# Patient Record
Sex: FEMALE
[De-identification: names, ages, dates, MRNs, and addresses within clinical notes are randomized per-mention and may not be internally consistent; named-entity substitution may affect disease eponyms.]

---

## 2019-01-07 ENCOUNTER — HOSPITAL ENCOUNTER (EMERGENCY)
Dept: HOSPITAL 92 - ERS | Age: 39
LOS: 1 days | Discharge: HOME | End: 2019-01-08
Payer: SELF-PAY

## 2019-01-07 DIAGNOSIS — M79.661: ICD-10-CM

## 2019-01-07 DIAGNOSIS — M25.561: Primary | ICD-10-CM

## 2019-01-07 DIAGNOSIS — M79.651: ICD-10-CM

## 2019-01-07 PROCEDURE — 36415 COLL VENOUS BLD VENIPUNCTURE: CPT

## 2019-01-07 PROCEDURE — 84443 ASSAY THYROID STIM HORMONE: CPT

## 2019-01-07 PROCEDURE — 80053 COMPREHEN METABOLIC PANEL: CPT

## 2019-01-07 PROCEDURE — 85025 COMPLETE CBC W/AUTO DIFF WBC: CPT

## 2019-01-08 LAB
ALBUMIN SERPL BCG-MCNC: 4 G/DL (ref 3.5–5)
ALP SERPL-CCNC: 54 U/L (ref 40–150)
ALT SERPL W P-5'-P-CCNC: 11 U/L (ref 8–55)
ANION GAP SERPL CALC-SCNC: 10 MMOL/L (ref 10–20)
AST SERPL-CCNC: 12 U/L (ref 5–34)
BASOPHILS # BLD AUTO: 0.1 THOU/UL (ref 0–0.2)
BASOPHILS NFR BLD AUTO: 1.1 % (ref 0–1)
BILIRUB SERPL-MCNC: 0.3 MG/DL (ref 0.2–1.2)
BUN SERPL-MCNC: 10 MG/DL (ref 7–18.7)
CALCIUM SERPL-MCNC: 9.4 MG/DL (ref 7.8–10.44)
CHLORIDE SERPL-SCNC: 104 MMOL/L (ref 98–107)
CO2 SERPL-SCNC: 27 MMOL/L (ref 22–29)
CREAT CL PREDICTED SERPL C-G-VRATE: 0 ML/MIN (ref 70–130)
EOSINOPHIL # BLD AUTO: 0.4 THOU/UL (ref 0–0.7)
EOSINOPHIL NFR BLD AUTO: 4.1 % (ref 0–10)
GLOBULIN SER CALC-MCNC: 3.7 G/DL (ref 2.4–3.5)
GLUCOSE SERPL-MCNC: 96 MG/DL (ref 70–105)
HGB BLD-MCNC: 11.8 G/DL (ref 12–16)
LYMPHOCYTES # BLD: 3.8 THOU/UL (ref 1.2–3.4)
LYMPHOCYTES NFR BLD AUTO: 36.4 % (ref 21–51)
MCH RBC QN AUTO: 27.9 PG (ref 27–31)
MCV RBC AUTO: 85.8 FL (ref 78–98)
MONOCYTES # BLD AUTO: 0.7 THOU/UL (ref 0.11–0.59)
MONOCYTES NFR BLD AUTO: 6.6 % (ref 0–10)
NEUTROPHILS # BLD AUTO: 5.4 THOU/UL (ref 1.4–6.5)
NEUTROPHILS NFR BLD AUTO: 51.8 % (ref 42–75)
PLATELET # BLD AUTO: 338 THOU/UL (ref 130–400)
POTASSIUM SERPL-SCNC: 4.1 MMOL/L (ref 3.5–5.1)
RBC # BLD AUTO: 4.22 MILL/UL (ref 4.2–5.4)
SODIUM SERPL-SCNC: 137 MMOL/L (ref 136–145)
WBC # BLD AUTO: 10.5 THOU/UL (ref 4.8–10.8)

## 2019-01-08 NOTE — ULT
PRELIMINARY REPORT/[facility]/EMERGENTY AFTER-HOURS PROCEDURE 

 

US Right Duplex Lower Extremity Veins, Limited

 

EXAM DATE/TIME:

1/7/2019 11:47 PM

 

CLINICAL HISTORY:

38 years old, female; Pain; Leg, lower; Right

 

TECHNIQUE:

Real-time Duplex ultrasound of the Right Lower Extremity with 2-D gray scale, color Doppler flow and 
spectral waveform analysis. Limited exam was focused on the right lower extremity veins.

 

COMPARISON:

No relevant prior studies available.

 

FINDINGS:

Right deep veins: Unremarkable. The common femoral, femoral, popliteal and visualized calf veins are 
patent without thrombus. Normal compressibility, augmentation response and Doppler waveforms.

Right superficial veins: Unremarkable. Saphenofemoral junction is patent without thrombus.

Soft tissues: No popliteal cyst.

 

IMPRESSION:

No evidence of deep vein thrombosis.

 

Thank you for allowing us to participate in the care of your patient.

Dictated and Authenticated by: Ranjit Eid MD

01/08/2019 1:13 AM Central Time ([place] & [place])

 

FINAL REPORT 

 

LEFT LOWER EXTREMITY VENOUS DOPPLER ULTRASOUND:

 

DTE: 1/7/2019.

 

COMPARISON: 

None.

 

HISTORY: 

Pain, edema, and swelling, assess for DVT.

 

FINDINGS/IMPRESSION:

The venous structures of the right lower extremity are assessed with color flow/spectral analysis.

 

I agree with the preliminary [facility] report.  No evidence for deep venous thrombosis of the right lower
 extremity.  This is a right lower extremity examination.  

 

POS: BOB